# Patient Record
Sex: MALE | Race: WHITE | Employment: FULL TIME | URBAN - METROPOLITAN AREA
[De-identification: names, ages, dates, MRNs, and addresses within clinical notes are randomized per-mention and may not be internally consistent; named-entity substitution may affect disease eponyms.]

---

## 2017-04-26 PROBLEM — R10.9 FLANK PAIN: Status: ACTIVE | Noted: 2017-04-26

## 2017-04-26 PROBLEM — R31.0 GROSS HEMATURIA: Status: ACTIVE | Noted: 2017-04-26

## 2020-12-02 ENCOUNTER — HOSPITAL ENCOUNTER (OUTPATIENT)
Dept: PHYSICAL THERAPY | Age: 60
Discharge: HOME | End: 2020-12-02
Payer: COMMERCIAL

## 2020-12-02 PROCEDURE — 97110 THERAPEUTIC EXERCISES: CPT

## 2020-12-02 PROCEDURE — 97535 SELF CARE MNGMENT TRAINING: CPT

## 2020-12-02 PROCEDURE — 97162 PT EVAL MOD COMPLEX 30 MIN: CPT

## 2020-12-02 NOTE — PROGRESS NOTES
PHYSICAL THERAPY - DAILY TREATMENT NOTE    Patient Name: Naresh Swain        Date: 2020  : 1960   YES Patient  Verified  Visit #:     Insurance: Payor: [place] / Plan: [id number] [facility] / Product Type: PPO /      In time: 10:30 am Out time: 11:20 am   Total Treatment Time: 50     Medicare/[facility] Time Tracking (below)   Total Timed Codes (min):  na 1:1 Treatment Time:  na     TREATMENT AREA =  Right knee pain (M25.561)    SUBJECTIVE    Pain Level (on 0 to 10 scale):  5  / 10   Medication Changes/New allergies or changes in medical history, any new surgeries or procedures? NO    If yes, update Summary List   Subjective Functional Status/Changes:  []  No changes reported     See POC          OBJECTIVE   Modalities Rationale:     decrease edema, decrease inflammation and decrease pain to improve patient's ability to tolerate prolonged ambulation with decreased pain    min [] Estim, type/location:                                      []  att     []  unatt     []  w/US     []  w/ice    []  w/heat    min []  Mechanical Traction: type/lbs                   []  pro   []  sup   []  int   []  cont    []  before manual    []  after manual    min []  Ultrasound, settings/location:      min []  Iontophoresis w/ dexamethasone, location:                                               []  take home patch       []  in clinic    min []  Ice     []  Heat    location/position:    10 min [x]  Vasopneumatic Device, press/temp: 36*, low pressure; supine with knee bolster     min []  Other:    [x] Skin assessment post-treatment (if applicable):    [x]  intact    [x]  redness- no adverse reaction     []redness  adverse reaction:        15 min Therapeutic Exercise:  [x]  See flow sheet   Rationale:      increase ROM and increase strength to improve the patients ability to tolerate prolonged ambulation and performing stair negotiation.      10 min Self Care: Reviewed diagnosis, prognosis, therapy progression Rationale:    Improve understanding of injury and therapy to have realistic expectation of therapy to improve compliance/adherence and satisfaction    Billed With/As:   [x] TE   [] TA   [] Neuro   [x] Self Care Patient Education: [x] Review HEP    [] Progressed/Changed HEP based on:   [] positioning   [] body mechanics   [] transfers   [] heat/ice application    [] other:        Other Objective/Functional Measures:    Shown and performed HEP. Limited with exercises due to L hip and knee pain. Post Treatment Pain Level (on 0 to 10) scale:   0.5 / 10     ASSESSMENT  Assessment/Changes in Function:     See POC     []  See Progress Note/Recertification   Patient will continue to benefit from skilled PT services to modify and progress therapeutic interventions, address functional mobility deficits, address ROM deficits, address strength deficits, analyze and address soft tissue restrictions and analyze and cue movement patterns to attain goals per POC.    Progress toward goals / Updated goals:    See POC     PLAN  [x]  Upgrade activities as tolerated YES Continue plan of care   []  Discharge due to :    [x]  Other: 2x week for 6 weeks     Therapist: Belén Mitchell PT, DPT    Date: 12/2/2020 Time: 1:00 PM

## 2020-12-02 NOTE — PROGRESS NOTES
[address] [facility] PHYSICAL THERAPY   [facility] [address],[place], [address] [facility] - Phone: [phone]  Fax: [phone] / [facility]  Patient Name: Reyes Horning : 1960   Medical   Diagnosis: Right knee pain (M25.561) Treatment Diagnosis: Right knee pain (M25.561)   Onset Date: 2020     Referral Source: Dorann Lesch, MD [facility]): 2020   Prior Hospitalization: See medical history Provider #: [id number]   Prior Level of Function: Able to go up/down stairs without difficulty; able to walk >5 minutes without increasing pain; able to kneel down to ground without difficulty    Comorbidities: CAD, prior MI in  requiring stent, HTN, asthma, prior smoker, prior L knee torn meniscus with surgical intervention x2, current L hip/knee pain, obesity   Medications: Verified on Patient Summary List   The Plan of Care and following information is based on the information from the initial evaluation.   ===========================================================================================  Assessment / key information:  Reyes Horning is a 63 y.o.  yo male who presents with S&S consistent with right knee pain (M25.561). Patient reports he stood up from a chair and felt a pop in his R knee with extreme pain, knee became swollen through the day. Patient reports he had torn the meniscus in his L knee years prior and that this feels very similar. Pt rates pain as 8/10 at worst, 0/10 at best and describes the pain as sharp and feels clicking/catching in joint. Symptoms increase with going down stairs, twisting knee on planted foot, and walking on uneven surfaces and decrease with rest/sitting . Had an injection in R knee 2 weeks prior which has helped with the pain considerably.  Of note patient is currently having L hip and knee pain which has significantly impacted his mobility, he is following up with orthopedic specialist regarding this. Patient is retired but enjoys metal detecting, gardening, and walking which all have been impacted from the R knee pain. Objective:   Current Deficits: Pain, impaired gait pattern, decreased R knee ROM, decreased LE strength, increased swelling/edema in R knee, decreased LE flexibility, impaired functional mobility including sit<>stand and squatting  Visual Inspection: noticeable swelling through R knee  Palpation: TTP posterior R knee, lateral joint line with palpable nodule that 'clicks' and causes high pain levels, vastus lat, VMO  Gait: Wide based gait with waddling pattern due to bilat antalgic gait, decreased stance phase on R   Sensation: WNL   Girth (cm):   Left knee: joint line 42 cm, 2 in below 37.3 cm, 4 in below 39.5 cm (unable to measure above due to restrictive clothing)  Right knee: joint line 44.6 cm, 2 in below 40 cm, 4 in below 38.2 cm (unable to measure above due to restrictive clothing)  AROM/PROM: L knee 0-120*; R knee 3-114*  Flexibility:  90/90 HS L unable to test due to hip pain, R 40*; Ely + bilat; Edgardo + bilat; Ciro Row + bilat  Strength:   Left LE: knee ext 5/5, knee flex 5/5 hip flex 4/5, hip abd 4/5, hip ext 3/5 P! Right LE:  knee ext 4/5, knee flex 4/5 hip flex 5/5, hip abd 4/5, hip ext 3+/5  Special Tests: Thesally + on R, Beata + on R   Functional Tests: SL balance EO 10\" on R, Squat forward translation and R list  FOTO score = 66 /100 (an established functional score where 100 = no disability). Patient educated on diagnosis, prognosis, POC and HEP. Patient issued copy of HEP and denied additional questions.  Patient will benefit from skilled PT services with a comprehensive POC/HEP to address impairments and restore function in order to return to prior level of function and prevent secondary impairments.  ===========================================================================================  Eval Complexity: History HIGH Complexity :3+ comorbidities / personal factors will impact the outcome/ POC ;  Examination  HIGH Complexity : 4+ Standardized tests and measures addressing body structure, function, activity limitation and / or participation in recreation ; Presentation MEDIUM Complexity : Evolving with changing characteristics ; Decision Making MEDIUM Complexity : FOTO score of 26-74; Overall Complexity MEDIUM  Problem List: pain affecting function, decrease ROM, decrease strength, edema affecting function, impaired gait/ balance, decrease ADL/ functional abilitiies, decrease activity tolerance, decrease flexibility/ joint mobility and decrease transfer abilities FOTO = 66  Treatment Plan may include any combination of the following: Therapeutic exercise, Therapeutic activities, Neuromuscular re-education, Physical agent/modality, Gait/balance training, Manual therapy, Patient education, Self Care training, Functional mobility training and Stair training  Patient / Family readiness to learn indicated by: asking questions, trying to perform skills and interest  Persons(s) to be included in education: patient (P)  Barriers to Learning/Limitations: no  Measures taken, if barriers to learning: N/A   Patient Goal (s): \"Decrease pain and improve strength in R leg\"   Patient self reported health status: good  Rehabilitation Potential: good   Short Term Goals: To be accomplished in  2  weeks:  1. Patient will demonstrate independence with HEP for self management of symptoms  2. Decrease max pain to 4-5/10 to assist with prolonged ambulation and being able to complete stairs.  Long Term Goals: To be accomplished in  6  weeks:  1. Decrease max pain to 1-2/10 to assist with walking over uneven surfaces and going up/down stairs  2. Improve FOTO Functional Status Score by 10 points in order to show significant functional improvement. 3.  Will rate a +5 on Global Rating of Change and be prepared to DC to [facility].   4. Will demonstrate R knee ROM 0-120*   Frequency / Duration:   Patient to be seen  2  times per week for 6  weeks:  Patient / Caregiver education and instruction: self care and exercises  Therapist Signature: Ivett Crump PT, DPT Date: 64/7/5736   Certification Period: na Time: 1:00 PM   ===========================================================================================  I certify that the above Physical Therapy Services are being furnished while the patient is under my care. I agree with the treatment plan and certify that this therapy is necessary. Physician Signature:        Date:       Time:     Please sign and return to [facility] or you may fax the signed copy to [phone]. Thank you.

## 2020-12-11 ENCOUNTER — HOSPITAL ENCOUNTER (OUTPATIENT)
Dept: PHYSICAL THERAPY | Age: 60
Discharge: HOME | End: 2020-12-11
Payer: COMMERCIAL

## 2020-12-11 PROCEDURE — 97110 THERAPEUTIC EXERCISES: CPT

## 2020-12-11 PROCEDURE — 97016 VASOPNEUMATIC DEVICE THERAPY: CPT

## 2020-12-11 NOTE — PROGRESS NOTES
PHYSICAL THERAPY - DAILY TREATMENT NOTE    Patient Name: Isabell Salguero        Date: 2020  : 1960   yes Patient  Verified  Visit #:     Insurance: Payor: Camille Senior / Plan: [facility] [place] PPO / Product Type: PPO /      In time: 930 Out time: 1020   Total Treatment Time: 50     Medicare/[facility] Time Tracking (below)   Total Timed Codes (min):  50 1:1 Treatment Time:       TREATMENT AREA =  Right knee pain [M25.561]    SUBJECTIVE  Pain Level (on 0 to 10 scale):  2-4  / 10   Medication Changes/New allergies or changes in medical history, any new surgeries or procedures?    no  If yes, update Summary List   Subjective Functional Status/Changes:  []  No changes reported     Pt reporting stiffness in the hips and knees. \"Having a rough morning. \"        OBJECTIVE  Modalities Rationale:     decrease inflammation and decrease pain to improve patient's ability to perform pain free ADLs. min [] Estim, type/location:                                      []  att     []  unatt     []  w/US     []  w/ice    []  w/heat    min []  Mechanical Traction: type/lbs                   []  pro   []  sup   []  int   []  cont    []  before manual    []  after manual    min []  Ultrasound, settings/location:      min []  Iontophoresis w/ dexamethasone, location:                                               []  take home patch       []  in clinic    min []  Ice     []  Heat    location/position:    10 min [x]  Vasopneumatic Device, press/temp: 36 deg, low, (R) knee. min []  Other:    [x] Skin assessment post-treatment (if applicable):    [x]  intact    []  redness- no adverse reaction     []redness  adverse reaction:        40 min Therapeutic Exercise:  [x]  See flow sheet   Rationale:      increase ROM, increase strength, improve coordination and improve balance to improve the patients ability to perform pain free ADLs.       Billed With/As:   [x] TE   [] TA   [] Neuro   [] Self Care Patient Education: [x] Review HEP [] Progressed/Changed HEP based on:   [] positioning   [] body mechanics   [] transfers   [] heat/ice application    [] other:      Other Objective/Functional Measures: Added multiple exercises to improve (B) hip and LE strength for ADLs. Post Treatment Pain Level (on 0 to 10) scale:   2  / 10     ASSESSMENT  Assessment/Changes in Function:     Limited knee ROM. Fair tolerance to exercises. []  See Progress Note/Recertification   Patient will continue to benefit from skilled PT services to modify and progress therapeutic interventions, address functional mobility deficits, address ROM deficits, address strength deficits, analyze and address soft tissue restrictions, analyze and cue movement patterns, analyze and modify body mechanics/ergonomics and assess and modify postural abnormalities to attain remaining goals. Progress toward goals / Updated goals:    Initiated therex.       PLAN  [x]  Upgrade activities as tolerated yes Continue plan of care   []  Discharge due to :    []  Other:      Therapist: Ricarda Martinez PTA    Date: 12/11/2020 Time: 9:35 AM     Future Appointments   Date Time Provider Osmar Dumont   12/11/2020  3:15 PM Diana Esparza PA-C [facility]   12/14/2020  2:15 PM Janina Monzon [facility]   12/16/2020  3:00 PM [facility]   12/22/2020  9:15 AM Janina Monzon [facility]   12/28/2020 11:00 AM [facility]   12/30/2020  8:45 AM Brandon Browne, [place], [facility]

## 2020-12-14 ENCOUNTER — HOSPITAL ENCOUNTER (OUTPATIENT)
Dept: PHYSICAL THERAPY | Age: 60
Discharge: HOME | End: 2020-12-14
Payer: COMMERCIAL

## 2020-12-14 PROCEDURE — 97016 VASOPNEUMATIC DEVICE THERAPY: CPT

## 2020-12-14 PROCEDURE — 97140 MANUAL THERAPY 1/> REGIONS: CPT

## 2020-12-14 PROCEDURE — 97110 THERAPEUTIC EXERCISES: CPT

## 2020-12-14 NOTE — PROGRESS NOTES
PHYSICAL THERAPY - DAILY TREATMENT NOTE    Patient Name: Donnell Herrera        Date: 2020  : 1960   yes Patient  Verified  Visit #:   3   of   12  Insurance: Payor: Linda Gil / Plan: [employer] PPO / Product Type: PPO /      In time: 215 Out time: 310   Total Treatment Time: 55     Medicare/[employer] Time Tracking (below)   Total Timed Codes (min):  55 1:1 Treatment Time:       TREATMENT AREA =  Right knee pain [M25.561]    SUBJECTIVE  Pain Level (on 0 to 10 scale):  3  / 10   Medication Changes/New allergies or changes in medical history, any new surgeries or procedures?    no  If yes, update Summary List   Subjective Functional Status/Changes:  []  No changes reported     This bike really helped my hip out the other day. OBJECTIVE  Modalities Rationale:     decrease inflammation and decrease pain to improve patient's ability to perform pain free ADLs. min [] Estim, type/location:                                      []  att     []  unatt     []  w/US     []  w/ice    []  w/heat    min []  Mechanical Traction: type/lbs                   []  pro   []  sup   []  int   []  cont    []  before manual    []  after manual    min []  Ultrasound, settings/location:      min []  Iontophoresis w/ dexamethasone, location:                                               []  take home patch       []  in clinic    min []  Ice     []  Heat    location/position:    10 min [x]  Vasopneumatic Device, press/temp: 36 deg, low, (R) knee. min []  Other:    [x] Skin assessment post-treatment (if applicable):    [x]  intact    []  redness- no adverse reaction     []redness  adverse reaction:        45 min Therapeutic Exercise:  [x]  See flow sheet   Rationale:      increase ROM, increase strength, improve coordination and improve balance to improve the patients ability to perform pain free ADLs.       Billed With/As:   [x] TE   [] TA   [] Neuro   [] Self Care Patient Education: [x] Review HEP    [] Progressed/Changed HEP based on:   [] positioning   [] body mechanics   [] transfers   [] heat/ice application    [] other:      Other Objective/Functional Measures: Therex per flow sheet. Post Treatment Pain Level (on 0 to 10) scale:   2  / 10     ASSESSMENT  Assessment/Changes in Function:     No exacerbation of symptoms. Pt compliant with HEP. []  See Progress Note/Recertification   Patient will continue to benefit from skilled PT services to modify and progress therapeutic interventions, address functional mobility deficits, address ROM deficits, address strength deficits, analyze and address soft tissue restrictions, analyze and cue movement patterns, analyze and modify body mechanics/ergonomics and assess and modify postural abnormalities to attain remaining goals. Progress toward goals / Updated goals:    No change in progress toward LTG's with today's session.       PLAN  [x]  Upgrade activities as tolerated yes Continue plan of care   []  Discharge due to :    []  Other:      Therapist: Primitivo Weber PTA    Date: 12/14/2020 Time: 2:19 PM     Future Appointments   Date Time Provider Osmar Dumont   12/16/2020  3:00 PM Azar Mew [address] [facility]   12/22/2020  9:15 AM Patricia Londono  [address] [facility]   12/28/2020 11:00 AM Azar Mew [address] [facility]   12/30/2020  8:45 AM Adamaris Keenan [place], [place] [address] [facility]   12/10/2021  1:00 PM [facility] NURSE [facility]   12/17/2021  1:00 PM Moises Resendiz PA-C [facility]

## 2020-12-16 ENCOUNTER — HOSPITAL ENCOUNTER (OUTPATIENT)
Dept: PHYSICAL THERAPY | Age: 60
Discharge: HOME | End: 2020-12-16
Payer: COMMERCIAL

## 2020-12-16 PROCEDURE — 97110 THERAPEUTIC EXERCISES: CPT

## 2020-12-16 PROCEDURE — 97016 VASOPNEUMATIC DEVICE THERAPY: CPT

## 2020-12-16 NOTE — PROGRESS NOTES
PHYSICAL THERAPY - DAILY TREATMENT NOTE    Patient Name: Stanton Bond        Date: 2020  : 1960   yes Patient  Verified  Visit #:     Insurance: Payor: Ernestina Posada / Plan: [employer] PPO / Product Type: PPO /      In time: 3 Out time: 415   Total Treatment Time: 75     Medicare/[facility] Time Tracking (below)   Total Timed Codes (min):  60 1:1 Treatment Time:       TREATMENT AREA =  Right knee pain [M25.561]    SUBJECTIVE  Pain Level (on 0 to 10 scale):  1  / 10   Medication Changes/New allergies or changes in medical history, any new surgeries or procedures?    no  If yes, update Summary List   Subjective Functional Status/Changes:  []  No changes reported     I'm feeling pretty good. OBJECTIVE  Modalities Rationale:     decrease inflammation and decrease pain to improve patient's ability to perform pain free ADLs. min [] Estim, type/location:                                      []  att     []  unatt     []  w/US     []  w/ice    []  w/heat    min []  Mechanical Traction: type/lbs                   []  pro   []  sup   []  int   []  cont    []  before manual    []  after manual    min []  Ultrasound, settings/location:      min []  Iontophoresis w/ dexamethasone, location:                                               []  take home patch       []  in clinic    min []  Ice     []  Heat    location/position:    10 min [x]  Vasopneumatic Device, press/temp: 36 deg, low, (R) knee. min []  Other:    [x] Skin assessment post-treatment (if applicable):    [x]  intact    []  redness- no adverse reaction     []redness  adverse reaction:        50 min Therapeutic Exercise:  [x]  See flow sheet   Rationale:      increase ROM, increase strength, improve coordination and improve balance to improve the patients ability to perform pain free ADLs.       Billed With/As:   [x] TE   [] TA   [] Neuro   [] Self Care Patient Education: [x] Review HEP    [] Progressed/Changed HEP based on:   [] positioning   [] body mechanics   [] transfers   [] heat/ice application    [] other:      Other Objective/Functional Measures: Therex per flow sheet. Post Treatment Pain Level (on 0 to 10) scale:   2  / 10     ASSESSMENT  Assessment/Changes in Function:     Pt (I) and compliant with HEP. Demonstrating improvement in LE strength with table exercises. []  See Progress Note/Recertification   Patient will continue to benefit from skilled PT services to modify and progress therapeutic interventions, address functional mobility deficits, address ROM deficits, address strength deficits, analyze and address soft tissue restrictions, analyze and cue movement patterns, analyze and modify body mechanics/ergonomics and assess and modify postural abnormalities to attain remaining goals. Progress toward goals / Updated goals:    Good progress toward LTG #1.       PLAN  [x]  Upgrade activities as tolerated yes Continue plan of care   []  Discharge due to :    []  Other:      Therapist: Pranav Ojeda PTA    Date: 12/16/2020 Time: 3:52 PM     Future Appointments   Date Time Provider Osmar Dumont   12/22/2020  9:15 AM Jojo Neves [address] [facility]   12/28/2020 11:00 AM Jojo Neves [address] [facility]   12/30/2020  8:45 AM Belén Holly [place] [address] [facility]   12/10/2021  1:00 PM [facility] NURSE [facility]   12/17/2021  1:00 PM Ben Ying PA-C [facility]

## 2020-12-22 ENCOUNTER — HOSPITAL ENCOUNTER (OUTPATIENT)
Dept: PHYSICAL THERAPY | Age: 60
Discharge: HOME | End: 2020-12-22
Payer: COMMERCIAL

## 2020-12-22 PROCEDURE — 97110 THERAPEUTIC EXERCISES: CPT

## 2020-12-22 PROCEDURE — 97016 VASOPNEUMATIC DEVICE THERAPY: CPT

## 2020-12-22 NOTE — PROGRESS NOTES
PHYSICAL THERAPY - DAILY TREATMENT NOTE    Patient Name: Shreya Kingsley        Date: 2020  : 1960   yes Patient  Verified  Visit #:     Insurance: Payor: Estella Craig / Plan: [place] [employer] [place] / Product Type: PPO /      In time: 007 Out time: [id number]   Total Treatment Time: 60     Medicare/[facility] Time Tracking (below)   Total Timed Codes (min):  60 1:1 Treatment Time:       TREATMENT AREA =  Right knee pain [M25.561]    SUBJECTIVE  Pain Level (on 0 to 10 scale):  1  / 10   Medication Changes/New allergies or changes in medical history, any new surgeries or procedures?    no  If yes, update Summary List   Subjective Functional Status/Changes:  []  No changes reported     No new complaints. OBJECTIVE  Modalities Rationale:     decrease inflammation and decrease pain to improve patient's ability to perform pain free ADLs. min [] Estim, type/location:                                      []  att     []  unatt     []  w/US     []  w/ice    []  w/heat    min []  Mechanical Traction: type/lbs                   []  pro   []  sup   []  int   []  cont    []  before manual    []  after manual    min []  Ultrasound, settings/location:      min []  Iontophoresis w/ dexamethasone, location:                                               []  take home patch       []  in clinic    min []  Ice     []  Heat    location/position:    10 min [x]  Vasopneumatic Device, press/temp: (R) knee, supine. min []  Other:    [x] Skin assessment post-treatment (if applicable):    [x]  intact    []  redness- no adverse reaction     []redness  adverse reaction:        50 min Therapeutic Exercise:  [x]  See flow sheet   Rationale:      increase ROM, increase strength, improve coordination and improve balance to improve the patients ability to perform pain free ADLs.       Billed With/As:   [x] TE   [] TA   [] Neuro   [] Self Care Patient Education: [x] Review HEP    [] Progressed/Changed HEP based on:   [] positioning   [] body mechanics   [] transfers   [] heat/ice application    [] other:      Other Objective/Functional Measures: Therex per flow sheet. Added step ups (fwd/lat) and mini-squats to improve LE strength for ADLs. Post Treatment Pain Level (on 0 to 10) scale:   1  / 10     ASSESSMENT  Assessment/Changes in Function:     Good tolerance to updated exercises. []  See Progress Note/Recertification   Patient will continue to benefit from skilled PT services to modify and progress therapeutic interventions, address functional mobility deficits, address ROM deficits, address strength deficits, analyze and address soft tissue restrictions, analyze and cue movement patterns, analyze and modify body mechanics/ergonomics and assess and modify postural abnormalities to attain remaining goals. Progress toward goals / Updated goals:    Good progress toward LTG #1.       PLAN  [x]  Upgrade activities as tolerated yes Continue plan of care   []  Discharge due to :    []  Other:      Therapist: Dena Trinidad PTA    Date: 12/22/2020 Time: 9:24 AM     Future Appointments   Date Time Provider Osmar Dumont   12/28/2020 11:00 AM Thao Lacy [id number]   12/30/2020  8:45 AM Miquel Mitchell, April, [address] [facility]

## 2020-12-28 ENCOUNTER — HOSPITAL ENCOUNTER (OUTPATIENT)
Dept: PHYSICAL THERAPY | Age: 60
Discharge: HOME | End: 2020-12-28
Payer: COMMERCIAL

## 2020-12-28 PROCEDURE — 97016 VASOPNEUMATIC DEVICE THERAPY: CPT

## 2020-12-28 PROCEDURE — 97110 THERAPEUTIC EXERCISES: CPT

## 2020-12-28 NOTE — PROGRESS NOTES
PHYSICAL THERAPY - DAILY TREATMENT NOTE    Patient Name: Corinne Herndon        Date: 2020  : 1960   yes Patient  Verified  Visit #:     Insurance: Payor: Leopoldo Edwards / Plan: Sue Hampton PPO / Product Type: PPO /      In time: 11 Out time: 7484   Total Treatment Time: 65     Medicare/[facility] Time Tracking (below)   Total Timed Codes (min):  65 1:1 Treatment Time:       TREATMENT AREA =  Right knee pain [M25.561]    SUBJECTIVE  Pain Level (on 0 to 10 scale):  1  / 10   Medication Changes/New allergies or changes in medical history, any new surgeries or procedures?    no  If yes, update Summary List   Subjective Functional Status/Changes:  []  No changes reported     Pt reporting some stiffness in the (L) knee today. OBJECTIVE  Modalities Rationale:     decrease inflammation and decrease pain to improve patient's ability to perform pain free ADLs. min [] Estim, type/location:                                      []  att     []  unatt     []  w/US     []  w/ice    []  w/heat    min []  Mechanical Traction: type/lbs                   []  pro   []  sup   []  int   []  cont    []  before manual    []  after manual    min []  Ultrasound, settings/location:      min []  Iontophoresis w/ dexamethasone, location:                                               []  take home patch       []  in clinic    min []  Ice     []  Heat    location/position:    10 min [x]  Vasopneumatic Device, press/temp: (R) knee, supine. min []  Other:    [x] Skin assessment post-treatment (if applicable):    [x]  intact    []  redness- no adverse reaction     []redness  adverse reaction:        55 min Therapeutic Exercise:  [x]  See flow sheet   Rationale:      increase ROM, increase strength, improve coordination and improve balance to improve the patients ability to perform pain free ADLs.       Billed With/As:   [x] TE   [] TA   [] Neuro   [] Self Care Patient Education: [x] Review HEP    [] Progressed/Changed HEP based on:   [] positioning   [] body mechanics   [] transfers   [] heat/ice application    [] other:      Other Objective/Functional Measures: Therex per flow sheet. FOTO = 82   GROC = +7      Post Treatment Pain Level (on 0 to 10) scale:   1  / 10     ASSESSMENT  Assessment/Changes in Function:     Therex per flow sheet. []  See Progress Note/Recertification   Patient will continue to benefit from skilled PT services to modify and progress therapeutic interventions, address functional mobility deficits, address ROM deficits, address strength deficits, analyze and address soft tissue restrictions, analyze and cue movement patterns, analyze and modify body mechanics/ergonomics and assess and modify postural abnormalities to attain remaining goals. Progress toward goals / Updated goals:    LTG's #2 and 3 met.       PLAN  [x]  Upgrade activities as tolerated yes Continue plan of care   []  Discharge due to :    []  Other:      Therapist: Becky Gupta PTA    Date: 12/28/2020 Time: 10:58 AM     Future Appointments   Date Time Provider Osmar Dumont   12/28/2020 11:00 AM Gerhardt Gower [facility] [place]   12/30/2020  8:45 AM Mar Bio, April, [address] [facility]

## 2020-12-30 ENCOUNTER — HOSPITAL ENCOUNTER (OUTPATIENT)
Dept: PHYSICAL THERAPY | Age: 60
Discharge: HOME | End: 2020-12-30
Payer: COMMERCIAL

## 2020-12-30 PROCEDURE — 97110 THERAPEUTIC EXERCISES: CPT

## 2020-12-30 PROCEDURE — 97535 SELF CARE MNGMENT TRAINING: CPT

## 2020-12-30 NOTE — PROGRESS NOTES
[address] [facility] PHYSICAL THERAPY   [facility], [address], [place], [address] [facility] - Phone: [phone]  Fax: [phone]  DISCHARGE SUMMARY  Patient Name: Domenica Martinez : 1960   Treatment/Medical Diagnosis: Right knee pain [M25.561]   Referral Source: Radha Kaminski MD     Date of Initial Visit: 2020 Attended Visits: 7 Missed Visits: 0     SUMMARY OF TREATMENT  Patient is a 64 yo male attended 7 sessions of OPPT at [facility] for right knee pain (M25.561). Skilled physical therapy has consisted of therapeutic exercise with focus on RLE strength and ROM, patient education including HEP, and modalities including vaso for pain and edema control. Patient has been provided with discharge HEP in order to maintain progress at home. CURRENT STATUS  Patient has made great progress toward goals, meeting all STG and LTGs. Patient reports 100% improvement in R knee since [facility]. Reports max pain at 1/10 during stair negotiation, average pain is 0/10. Demonstrates improved R knee ROM (3-114 deg at evaluation; currently 0-121 deg) and strength (flex/ext 4/5 at evaluation; currently 5/5). Patient has been provided with d/c HEP so that he can maintain the progress made to date at home, reports he feels confident self-managing symptoms at this point. Short Term Goal/Measure of Progress Goal Met? 1. Patient will demonstrate independence with HEP for self management of symptoms   Status at last Eval: Initial Eval Current Status: Indep with HEP yes   2. Decrease max pain to 4-5/10 to assist with prolonged ambulation and being able to complete stairs. Status at last Eval: Max pain 8/10 Current Status: Max pain 1/10 yes     Long Term Goal/Measure of Progress Goal Met? 1. Decrease max pain to 1-2/10 to assist with walking over uneven surfaces and going up/down stairs   Status at last Eval: Max pain 8/10 Current Status: Max pain 1/10 yes   2.   Improve FOTO Functional Status Score by 10 points in order to show significant functional improvement. Status at last Eval: 66/100 Current Status: 82/100 (+16) yes   3. Will rate a +5 on Global Rating of Change and be prepared to DC to HEP. Status at last Eval: Initial  Current Status: +7 yes     4. Will demonstrate R knee ROM 0-120*   Status at last Eval: 3-114* Current Status: 0-121* yes     RECOMMENDATIONS  Discharge from physical therapy, patient has met all established goals. He has exceeded PLOF and is independent in HEP. Thank you for this referral.     If you have any questions/comments please contact us directly at [phone]. Thank you for allowing us to assist in the care of your patient.     Therapist Signature: John Bender DPT  Date: 12/30/2020     Time: 9:08 AM

## 2020-12-30 NOTE — PROGRESS NOTES
PHYSICAL THERAPY - DAILY TREATMENT NOTE    Patient Name: Pieter Buchanan        Date: 2020  : 1960   yes Patient  Verified  Visit #:     Insurance: Payor: Hina Dickerson / Plan: [employer] PPO / Product Type: PPO /      In time: 8:45 am Out time: 9:31 am   Total Treatment Time: 46     Medicare/[employer] Time Tracking (below)   Total Timed Codes (min):  na 1:1 Treatment Time:  na     TREATMENT AREA =  Right knee pain [M25.561]    SUBJECTIVE  Pain Level (on 0 to 10 scale):  1  / 10   Medication Changes/New allergies or changes in medical history, any new surgeries or procedures?    no  If yes, update Summary List   Subjective Functional Status/Changes:  []  No changes reported     Patient reports he feels good to discharge for R knee and will continue HEP at home. Reports 100% improvement since [facility] in R knee. OBJECTIVE    35 min Therapeutic Exercise:  [x]  See flow sheet   Rationale:      increase ROM, increase strength, improve coordination and improve balance to improve the patients ability to tolerate prolong ambulation and stair negotiation with decreased pain      11 min Self Care: Instructed and educated patient in discharge Home Exercise Program, provided education on continued performance of HEP for maintenance of progress    Rationale: In order to allow patient to manage symptoms at home independently     Billed With/As:   [x] TE   [] TA   [] Neuro   [] Self Care Patient Education: [x] Review HEP    [] Progressed/Changed HEP based on:   [] positioning   [] body mechanics   [] transfers   [] heat/ice application    [x] other: Provided d/c HEP with green t-band     Other Objective/Functional Measures:  Reassessed for discharge summary. R knee ROM 0-121*. R knee strength 5/5. Average pain 0/10, max pain 1/10 during stair negotiation. Post Treatment Pain Level (on 0 to 10) scale:    10     ASSESSMENT  Assessment/Changes in Function:     Patient has met all goals.  Discharge from therapy services for R knee. [x]  See Discharge Summary       Progress toward goals / Updated goals:    Patient has met all STG and LTG. PLAN  []  Upgrade activities as tolerated no Continue plan of care   [x]  Discharge due to : Met all goals   []  Other:      Therapist: Hien Barrientos PT, DPT     Date: 12/30/2020 Time: 9:05 AM     No future appointments.

## 2021-12-17 PROBLEM — H61.21 IMPACTED CERUMEN OF RIGHT EAR: Status: ACTIVE | Noted: 2020-02-28

## 2021-12-17 PROBLEM — R06.00 DYSPNEA: Status: ACTIVE | Noted: 2021-12-17

## 2021-12-17 PROBLEM — M79.10 MYALGIA: Status: ACTIVE | Noted: 2019-10-22

## 2021-12-17 PROBLEM — R21 RASH: Status: ACTIVE | Noted: 2018-10-23

## 2021-12-17 PROBLEM — R79.89 ELEVATED LFTS: Status: ACTIVE | Noted: 2020-09-02

## 2021-12-17 PROBLEM — J45.909 ASTHMA: Status: ACTIVE | Noted: 2018-10-23

## 2021-12-17 PROBLEM — J06.9 UPPER RESPIRATORY INFECTION: Status: ACTIVE | Noted: 2021-04-27
